# Patient Record
Sex: FEMALE | Race: WHITE | NOT HISPANIC OR LATINO | Employment: OTHER | URBAN - METROPOLITAN AREA
[De-identification: names, ages, dates, MRNs, and addresses within clinical notes are randomized per-mention and may not be internally consistent; named-entity substitution may affect disease eponyms.]

---

## 2019-09-09 ENCOUNTER — APPOINTMENT (OUTPATIENT)
Dept: GENERAL RADIOLOGY | Facility: HOSPITAL | Age: 24
End: 2019-09-09

## 2019-09-09 ENCOUNTER — HOSPITAL ENCOUNTER (EMERGENCY)
Facility: HOSPITAL | Age: 24
Discharge: HOME | End: 2019-09-09
Admitting: EMERGENCY MEDICINE

## 2019-09-09 VITALS
HEIGHT: 62 IN | WEIGHT: 188.05 LBS | HEART RATE: 74 BPM | BODY MASS INDEX: 34.61 KG/M2 | TEMPERATURE: 98.1 F | RESPIRATION RATE: 16 BRPM | OXYGEN SATURATION: 100 % | DIASTOLIC BLOOD PRESSURE: 74 MMHG | SYSTOLIC BLOOD PRESSURE: 111 MMHG

## 2019-09-09 DIAGNOSIS — S90.32XA CONTUSION OF LEFT FOOT, INITIAL ENCOUNTER: ICD-10-CM
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
## 2019-09-09 DIAGNOSIS — S90.121A CONTUSION OF FIFTH TOE OF RIGHT FOOT, INITIAL ENCOUNTER: ICD-10-CM

## 2019-09-09 PROCEDURE — 73660 X-RAY EXAM OF TOE(S): CPT

## 2019-09-09 PROCEDURE — 99284 EMERGENCY DEPT VISIT MOD MDM: CPT

## 2019-09-09 PROCEDURE — 73620 X-RAY EXAM OF FOOT: CPT

## 2019-09-09 RX ORDER — ACETAMINOPHEN 500 MG
1000 TABLET ORAL ONCE
Status: COMPLETED | OUTPATIENT
Start: 2019-09-09 | End: 2019-09-09

## 2019-09-09 RX ORDER — ALBUTEROL SULFATE 90 UG/1
2 AEROSOL, METERED RESPIRATORY (INHALATION) EVERY 4 HOURS PRN
COMMUNITY
End: 2022-07-19

## 2019-09-09 RX ORDER — LURASIDONE HYDROCHLORIDE 40 MG/1
40 TABLET, FILM COATED ORAL DAILY
COMMUNITY
End: 2020-11-10 | Stop reason: SDUPTHER

## 2019-09-09 RX ADMIN — ACETAMINOPHEN 1000 MG: 500 TABLET, FILM COATED ORAL at 10:36

## 2019-09-09 NOTE — ED PROVIDER NOTES
"Subjective   24-year-old female presents status post fall.  Patient reports \"I lost my footing on the last step while walking and off the porch.  I took my legs under and landed on the top of my feet and onto my bottom.  I could not catch myself because I was carrying the baby, but she wasn't hurt I took the fall.\"  Patient denies head injury.  Denies any spinal tenderness.  Patient reports that she is approximately 6 weeks gestation.  Denies abdominal pain, reports that she has an appointment with her OB/GYN next week.    1. Location: Right foot  2. Quality: Burning  3. Severity: Mild  4. Worsening factors: Weightbearing  5. Alleviating factors: Denies  6. Onset: Prior to arrival  7. Radiation: Denies  8. Frequency: Intermittent  9. Co-morbidities: Denies  10. Source: Patient              Review of Systems   Constitutional: Negative for chills, diaphoresis and fever.   HENT: Negative for ear discharge and rhinorrhea.    Eyes: Negative for photophobia and visual disturbance.   Respiratory: Negative for shortness of breath.    Cardiovascular: Negative for chest pain.   Gastrointestinal: Negative for abdominal pain, nausea and vomiting.   Musculoskeletal: Negative for back pain, gait problem, joint swelling and neck pain.   Skin: Positive for wound. Negative for pallor.   Neurological: Negative for dizziness, weakness and headaches.   Psychiatric/Behavioral: Negative for confusion.   All other systems reviewed and are negative.      Past Medical History:   Diagnosis Date   • ADHD    • Asperger syndrome    • Bipolar 1 disorder (CMS/HCC)        Allergies   Allergen Reactions   • Sulfa Antibiotics Rash       Past Surgical History:   Procedure Laterality Date   •  SECTION     • EAR TUBES     • ENDOSCOPY         History reviewed. No pertinent family history.    Social History     Socioeconomic History   • Marital status:      Spouse name: Not on file   • Number of children: Not on file   • Years of " education: Not on file   • Highest education level: Not on file   Tobacco Use   • Smoking status: Never Smoker   Substance and Sexual Activity   • Alcohol use: No     Frequency: Never   • Drug use: No   • Sexual activity: Defer           Objective   Physical Exam   Constitutional: She is oriented to person, place, and time. She appears well-developed and well-nourished. She is active.   HENT:   Head: Normocephalic and atraumatic.   Right Ear: External ear normal.   Left Ear: External ear normal.   Eyes: Conjunctivae and EOM are normal. Pupils are equal, round, and reactive to light.   Neck: Normal range of motion. Neck supple.   Cardiovascular: Intact distal pulses.   Pulses:       Dorsalis pedis pulses are 2+ on the right side, and 2+ on the left side.        Posterior tibial pulses are 2+ on the right side, and 2+ on the left side.   Abdominal: Soft. Bowel sounds are normal. She exhibits no distension. There is no tenderness. There is no guarding.   Musculoskeletal: Normal range of motion. She exhibits tenderness. She exhibits no edema or deformity.        Right foot: There is tenderness and bony tenderness. There is normal range of motion, no swelling, normal capillary refill, no crepitus, no deformity and no laceration.        Left foot: There is tenderness and bony tenderness. There is normal range of motion, no swelling, normal capillary refill, no crepitus, no deformity and no laceration.   Distal pulses intact bilaterally 2+.  Sensation intact.  Cap refill less than 2 seconds.        Neurological: She is alert and oriented to person, place, and time. No sensory deficit.   Skin: Skin is warm, dry and intact. Capillary refill takes less than 2 seconds. No rash noted.   Psychiatric: She has a normal mood and affect. Her behavior is normal. Judgment and thought content normal.   Nursing note and vitals reviewed.      Procedures           ED Course      Xr Foot 2 View Left    Result Date: 9/9/2019  Normal 2 views  of the left foot.  Electronically Signed By-Dr. Yodit Hughes MD On:9/9/2019 11:14 AM This report was finalized on [id number] by Dr. Yodit Hughes MD.    Medications   acetaminophen (TYLENOL) tablet 1,000 mg (1,000 mg Oral Given 9/9/19 1036)     Labs Reviewed - No data to display              MDM  Number of Diagnoses or Management Options  Diagnosis management comments: Chart Review:  Comorbidity:  Imaging: Was interpreted by physician and reviewed by myself: Xr Foot 2 View Left    Result Date: 9/9/2019  Normal 2 views of the left foot.  Electronically Signed By-Dr. Yodit Hughes MD On:9/9/2019 11:14 AM This report was finalized on [id number] by Dr. Yodit Hughes MD.    Disposition/Treatment: Discussed results with patient, verbalized understanding.  Agreeable with plan of care.    Patient undressed and placed in gown for exam.  X-ray obtained to left foot.  Ice pack applied.  Patient given Tylenol 1 g p.o. for pain.  Abrasions cleaned and bandage applied.  Patient given follow-up with PCP and return to the ER for new or worsening symptoms.         Amount and/or Complexity of Data Reviewed  Tests in the radiology section of CPT®: reviewed        Final diagnoses:   Fall, initial encounter   Contusion of left foot, initial encounter   Contusion of fifth toe of right foot, initial encounter              Robyn Tamez NP  09/09/19 8080

## 2019-09-09 NOTE — DISCHARGE INSTRUCTIONS
Apply ice every 2 hours while awake, on for 20 minutes.  Cleanse abrasions twice daily with antibacterial soap and water, then apply bacitracin and bandage if in dirty environment.  Take Tylenol every 4-6 hours as needed for pain.  Follow-up with PCP as needed for primary care needs.  Return to the ER for new or worsening symptoms.

## 2019-09-09 NOTE — ED NOTES
Pt c/o bilat ankle and right pinky toe pain s/p rolling ankle off step and falling a few hours ago; states was holding infant daughter and couldn't break fall; states walked here from home for eval.     Florina Sales RN  09/09/19 [id number]

## 2019-09-09 NOTE — ED NOTES
I cleaned an abrasion on pt's right foot with shur clenz and put a bordered guaze on it.     Fernanda Chau  09/09/19 4448

## 2019-09-26 ENCOUNTER — OFFICE VISIT (OUTPATIENT)
Dept: FAMILY MEDICINE CLINIC | Facility: CLINIC | Age: 24
End: 2019-09-26

## 2019-09-26 VITALS
HEIGHT: 62 IN | TEMPERATURE: 98.8 F | DIASTOLIC BLOOD PRESSURE: 71 MMHG | OXYGEN SATURATION: 98 % | SYSTOLIC BLOOD PRESSURE: 109 MMHG | BODY MASS INDEX: 35.15 KG/M2 | HEART RATE: 85 BPM | WEIGHT: 191 LBS

## 2019-09-26 DIAGNOSIS — Z34.90 PREGNANCY, UNSPECIFIED GESTATIONAL AGE: Primary | ICD-10-CM

## 2019-09-26 LAB
B-HCG UR QL: POSITIVE
HCG INTACT+B SERPL-ACNC: NORMAL MIU/ML
INTERNAL NEGATIVE CONTROL: NEGATIVE
INTERNAL POSITIVE CONTROL: POSITIVE
Lab: ABNORMAL

## 2019-09-26 PROCEDURE — 99213 OFFICE O/P EST LOW 20 MIN: CPT | Performed by: PHYSICIAN ASSISTANT

## 2019-09-26 PROCEDURE — 84702 CHORIONIC GONADOTROPIN TEST: CPT | Performed by: PHYSICIAN ASSISTANT

## 2019-09-26 PROCEDURE — 36415 COLL VENOUS BLD VENIPUNCTURE: CPT | Performed by: PHYSICIAN ASSISTANT

## 2019-09-26 PROCEDURE — 81025 URINE PREGNANCY TEST: CPT | Performed by: PHYSICIAN ASSISTANT

## 2019-09-26 RX ORDER — FENUGREEK SEED/BL.THISTLE/ANIS 340 MG
1 CAPSULE ORAL DAILY
Qty: 90 EACH | Refills: 5 | Status: SHIPPED | OUTPATIENT
Start: 2019-09-26 | End: 2020-11-10 | Stop reason: SDUPTHER

## 2019-09-26 NOTE — PROGRESS NOTES
"Subjective  Misa Torres is a 24 y.o. female     History of Present Illness  Patient is a 24-year-old white female here today because she had a positive pregnancy test at home.  She has a 5-month-old baby, she is not currently breast-feeding.  Her last period was in July 2019.  She is sexually active and is not using any birth control at this time.  She is .  She was unable to get into an OB/GYN in [place], as they did not accept her insurance.  She is requesting referral to a different OB/GYN today.  She denies taking a prenatal vitamin.    The following portions of the patient's history were reviewed and updated as appropriate: allergies, current medications and problem list.    Review of Systems   Genitourinary: Positive for menstrual problem (missed period).       Objective  Physical Exam   Constitutional: She appears well-developed and well-nourished.   Psychiatric: She has a normal mood and affect. Her behavior is normal.       Vitals:    09/26/19 1015   BP: 109/71   BP Location: Right arm   Patient Position: Sitting   Cuff Size: Adult   Pulse: 85   Temp: 98.8 °F (37.1 °C)   TempSrc: Oral   SpO2: 98%   Weight: 86.6 kg (191 lb)   Height: 157.5 cm (62\")     Body mass index is 34.93 kg/m².    PHQ-9 Total Score:        Assessment/Plan  Diagnoses and all orders for this visit:    1. Pregnancy, unspecified gestational age (Primary)  Comments:  Patient requesting referral to OB/GYN, she needs to check with her insurance to see who is covered.  hCG blood testing today.  Rx for prenatal for patient to start immediately.  Orders:  -     hCG, Quantitative, Pregnancy    Other orders  -     Wvaqev-[id number]-Ca-Omega 3 (PRENATAL + COMPLETE MULTI) 18-0.8 & 290 MG therapy; Take 1 tablet by mouth Daily.  Dispense: 90 each; Refill: 5              "

## 2020-01-02 ENCOUNTER — HOSPITAL ENCOUNTER (EMERGENCY)
Facility: HOSPITAL | Age: 25
Discharge: LEFT BEFORE DISCHARGE | End: 2020-01-02

## 2020-01-06 ENCOUNTER — OFFICE VISIT (OUTPATIENT)
Dept: FAMILY MEDICINE CLINIC | Facility: CLINIC | Age: 25
End: 2020-01-06

## 2020-01-06 VITALS
TEMPERATURE: 97.5 F | HEART RATE: 99 BPM | WEIGHT: 200 LBS | OXYGEN SATURATION: 96 % | DIASTOLIC BLOOD PRESSURE: 76 MMHG | SYSTOLIC BLOOD PRESSURE: 116 MMHG | BODY MASS INDEX: 36.58 KG/M2

## 2020-01-06 DIAGNOSIS — H69.82 EUSTACHIAN TUBE DYSFUNCTION, LEFT: Primary | ICD-10-CM

## 2020-01-06 PROCEDURE — 99213 OFFICE O/P EST LOW 20 MIN: CPT | Performed by: PHYSICIAN ASSISTANT

## 2020-01-06 RX ORDER — LORATADINE 10 MG/1
10 TABLET ORAL DAILY
Qty: 90 TABLET | Refills: 1 | Status: SHIPPED | OUTPATIENT
Start: 2020-01-06 | End: 2022-07-19

## 2020-01-06 NOTE — PROGRESS NOTES
Subjective  Misa Torres is a 24 y.o. female     History of Present Illness  Patient is a 24-year-old white female complaining of left-sided ear pain for the past 3 to 4 days.  She states it feels very full and pressure.  She feels popping when she yawns.  She has not tried anything over-the-counter.  She is 24 weeks pregnant.    The following portions of the patient's history were reviewed and updated as appropriate: allergies, current medications, past family history, past medical history, past social history, past surgical history and problem list.    Review of Systems   Constitutional: Negative for fever.   HENT: Positive for ear pain. Negative for sinus pressure and sore throat.    Eyes: Negative for blurred vision, pain and redness.   Respiratory: Negative for shortness of breath.    Cardiovascular: Negative for chest pain.       Objective  Physical Exam   Constitutional: She is oriented to person, place, and time. She appears well-developed and well-nourished.   HENT:   Head: Normocephalic and atraumatic.   Right Ear: External ear normal. Tympanic membrane is erythematous (very mild) and bulging.   Left Ear: External ear normal.   Mouth/Throat: Oropharynx is clear and moist.   Eyes: Pupils are equal, round, and reactive to light. EOM are normal.   Neck: Normal range of motion. Neck supple.   Cardiovascular: Normal rate, regular rhythm and normal heart sounds.   Pulmonary/Chest: Effort normal and breath sounds normal.   Abdominal: Soft. Bowel sounds are normal.   Musculoskeletal: Normal range of motion.   Neurological: She is alert and oriented to person, place, and time.   Skin: Skin is warm and dry.   Psychiatric: She has a normal mood and affect. Her behavior is normal.       Vitals:    01/06/20 1158   BP: 116/76   BP Location: Right arm   Patient Position: Sitting   Cuff Size: Adult   Pulse: 99   Temp: 97.5 °F (36.4 °C)   TempSrc: Oral   SpO2: 96%   Weight: 90.7 kg (200 lb)     Body mass index is  36.58 kg/m².    PHQ-9 Total Score: 11      Assessment/Plan  Diagnoses and all orders for this visit:    1. Eustachian tube dysfunction, left (Primary)  Comments:  Left tympanic membrane is very mildly red, appears more full of fluid.  Due to pregnancy, I recommended she try Claritin daily, if not improving will prescribe antibiotics, likely amoxicillin.    Other orders  -     loratadine (CLARITIN) 10 MG tablet; Take 1 tablet by mouth Daily.  Dispense: 90 tablet; Refill: 1

## 2020-02-17 ENCOUNTER — TELEPHONE (OUTPATIENT)
Dept: FAMILY MEDICINE CLINIC | Facility: CLINIC | Age: 25
End: 2020-02-17

## 2020-02-17 RX ORDER — OSELTAMIVIR PHOSPHATE 75 MG/1
75 CAPSULE ORAL DAILY
Qty: 10 CAPSULE | Refills: 0 | Status: SHIPPED | OUTPATIENT
Start: 2020-02-17 | End: 2020-02-27

## 2020-02-17 NOTE — TELEPHONE ENCOUNTER
I called in Tamiflu for her to take once daily to prevent flu, she is allowed to take this while pregnant, however she should weigh the risks versus benefits and maybe discuss with her OB/GYN.

## 2020-02-17 NOTE — TELEPHONE ENCOUNTER
just diagnosed with the flu and pneumonia yesterday. Wants to know if there is anything you can call out for her so she doesn't get it. She is 29 weeks pregnant.

## 2020-11-10 ENCOUNTER — OFFICE VISIT (OUTPATIENT)
Dept: FAMILY MEDICINE CLINIC | Facility: CLINIC | Age: 25
End: 2020-11-10

## 2020-11-10 VITALS
RESPIRATION RATE: 16 BRPM | WEIGHT: 210 LBS | BODY MASS INDEX: 38.64 KG/M2 | HEART RATE: 69 BPM | SYSTOLIC BLOOD PRESSURE: 121 MMHG | DIASTOLIC BLOOD PRESSURE: 73 MMHG | OXYGEN SATURATION: 99 % | TEMPERATURE: 98.2 F | HEIGHT: 62 IN

## 2020-11-10 DIAGNOSIS — G56.03 BILATERAL CARPAL TUNNEL SYNDROME: Primary | ICD-10-CM

## 2020-11-10 DIAGNOSIS — Z00.00 PREVENTATIVE HEALTH CARE: ICD-10-CM

## 2020-11-10 PROCEDURE — 90686 IIV4 VACC NO PRSV 0.5 ML IM: CPT | Performed by: PHYSICIAN ASSISTANT

## 2020-11-10 PROCEDURE — 99213 OFFICE O/P EST LOW 20 MIN: CPT | Performed by: PHYSICIAN ASSISTANT

## 2020-11-10 PROCEDURE — G0008 ADMIN INFLUENZA VIRUS VAC: HCPCS | Performed by: PHYSICIAN ASSISTANT

## 2020-11-10 RX ORDER — LURASIDONE HYDROCHLORIDE 20 MG/1
TABLET, FILM COATED ORAL
COMMUNITY
Start: 2020-10-26 | End: 2022-07-19

## 2020-11-10 RX ORDER — PROPRANOLOL HYDROCHLORIDE 10 MG/1
TABLET ORAL
COMMUNITY
Start: 2020-11-07 | End: 2022-07-19

## 2020-11-10 RX ORDER — PREDNISONE 10 MG/1
TABLET ORAL
Qty: 20 TABLET | Refills: 0 | Status: SHIPPED | OUTPATIENT
Start: 2020-11-10 | End: 2022-07-19

## 2020-11-10 RX ORDER — ESCITALOPRAM OXALATE 20 MG/1
20 TABLET ORAL
COMMUNITY
Start: 2020-10-26

## 2020-11-10 NOTE — PROGRESS NOTES
"Subjective  Misa Torres is a 25 y.o. female     History of Present Illness  Patient is a 25-year-old white female complaining of bilateral hand pain for the night that are waking her up.  She states that her hands feel numb and tingling and painful pain improved when she gets up and \"shakes them out\".  This began when she was pregnant, and is worsened within the last 3 months.  Her child is 6 months old.  She denies having tried anything over-the-counter to improve her symptoms.  She wants to see a hand specialist, however she needs a referral.    The following portions of the patient's history were reviewed and updated as appropriate: allergies, current medications, past family history, past medical history, past social history, past surgical history and problem list.    Review of Systems   Musculoskeletal:        Hand pain bilaterally.       Objective  Physical Exam  Musculoskeletal:      Comments: No acute abnormality of the hand on physical exam, Tinel and Phalen testing positive bilaterally.  No PIP or DIP joint tenderness or swelling.         Vitals:    11/10/20 0936   BP: 121/73   BP Location: Left arm   Patient Position: Sitting   Cuff Size: Adult   Pulse: 69   Resp: 16   Temp: 98.2 °F (36.8 °C)   TempSrc: Temporal   SpO2: 99%   Weight: 95.3 kg (210 lb)   Height: 157.5 cm (62\")     Body mass index is 38.41 kg/m².    PHQ-9 Total Score:        Assessment/Plan  Diagnoses and all orders for this visit:    1. Bilateral carpal tunnel syndrome (Primary)  Comments:  New, prescription for prednisone taper, recommended wrist braces at bedtime and resupport throughout the day.  Referral if not improving.    2. Preventative health care  Comments:  Flu vaccine today.    Other orders  -     predniSONE (DELTASONE) 10 MG tablet; 4 tabs daily x 2 days then 3 tabs daily x 2 days then 2 tabs daily x 2 days then 1 tab daily x 2 days  Dispense: 20 tablet; Refill: 0  -     FluLaval Quad >6 Months " ([id number])

## 2022-12-26 ENCOUNTER — HOSPITAL ENCOUNTER (EMERGENCY)
Facility: HOSPITAL | Age: 27
Discharge: HOME | End: 2022-12-26
Attending: EMERGENCY MEDICINE | Admitting: EMERGENCY MEDICINE

## 2022-12-26 ENCOUNTER — APPOINTMENT (OUTPATIENT)
Dept: GENERAL RADIOLOGY | Facility: HOSPITAL | Age: 27
End: 2022-12-26

## 2022-12-26 VITALS
RESPIRATION RATE: 16 BRPM | TEMPERATURE: 97.5 F | OXYGEN SATURATION: 99 % | DIASTOLIC BLOOD PRESSURE: 80 MMHG | HEIGHT: 62 IN | SYSTOLIC BLOOD PRESSURE: 119 MMHG | BODY MASS INDEX: 36.8 KG/M2 | WEIGHT: 200 LBS | HEART RATE: 90 BPM

## 2022-12-26 DIAGNOSIS — M54.2 NECK PAIN: ICD-10-CM

## 2022-12-26 DIAGNOSIS — M79.631 RIGHT FOREARM PAIN: ICD-10-CM

## 2022-12-26 DIAGNOSIS — S50.11XA CONTUSION OF RIGHT FOREARM, INITIAL ENCOUNTER: ICD-10-CM

## 2022-12-26 DIAGNOSIS — S80.00XA CONTUSION OF KNEE, UNSPECIFIED LATERALITY, INITIAL ENCOUNTER: ICD-10-CM

## 2022-12-26 DIAGNOSIS — M54.50 ACUTE MIDLINE LOW BACK PAIN WITHOUT SCIATICA: ICD-10-CM

## 2022-12-26 DIAGNOSIS — M25.561 ACUTE PAIN OF BOTH KNEES: ICD-10-CM

## 2022-12-26 DIAGNOSIS — S16.1XXA STRAIN OF NECK MUSCLE, INITIAL ENCOUNTER: ICD-10-CM

## 2022-12-26 DIAGNOSIS — M25.562 ACUTE PAIN OF BOTH KNEES: ICD-10-CM
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
## 2022-12-26 PROCEDURE — 99283 EMERGENCY DEPT VISIT LOW MDM: CPT

## 2022-12-26 PROCEDURE — 72110 X-RAY EXAM L-2 SPINE 4/>VWS: CPT

## 2022-12-26 PROCEDURE — 73090 X-RAY EXAM OF FOREARM: CPT

## 2022-12-26 PROCEDURE — 72050 X-RAY EXAM NECK SPINE 4/5VWS: CPT

## 2022-12-26 PROCEDURE — 73562 X-RAY EXAM OF KNEE 3: CPT

## 2022-12-26 RX ORDER — LIDOCAINE 50 MG/G
1 PATCH TOPICAL EVERY 24 HOURS
Qty: 6 EACH | Refills: 0 | Status: SHIPPED | OUTPATIENT
Start: 2022-12-26
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
## 2022-12-27 NOTE — DISCHARGE INSTRUCTIONS
Apply ice to painful areas for 20 minutes at a time for the next 72 hours help with pain and swelling.    Use lidocaine patches as needed for back or neck pain.    Over-the-counter Tylenol or ibuprofen as needed for pain.    Follow-up with your primary care provider in 3-5 days.  If you do not have a primary care provider call [phone] for help in finding one, or you may follow up with [facility] at [phone].    Return to ED for any new or worsening symptoms

## 2023-09-26 ENCOUNTER — LAB (OUTPATIENT)
Dept: LAB | Facility: HOSPITAL | Age: 28
End: 2023-09-26
Payer: MEDICARE

## 2023-09-26 ENCOUNTER — TRANSCRIBE ORDERS (OUTPATIENT)
Dept: LAB | Facility: HOSPITAL | Age: 28
End: 2023-09-26
Payer: MEDICAID

## 2023-09-26 DIAGNOSIS — R21 ATYPICAL RASH: ICD-10-CM

## 2023-09-26 DIAGNOSIS — R21 ATYPICAL RASH: Primary | ICD-10-CM

## 2023-09-26 PROCEDURE — 86038 ANTINUCLEAR ANTIBODIES: CPT

## 2023-09-26 PROCEDURE — 36415 COLL VENOUS BLD VENIPUNCTURE: CPT

## 2023-09-27 LAB — ANA SER QL: NEGATIVE
